# Patient Record
Sex: MALE | Race: WHITE | NOT HISPANIC OR LATINO | URBAN - METROPOLITAN AREA
[De-identification: names, ages, dates, MRNs, and addresses within clinical notes are randomized per-mention and may not be internally consistent; named-entity substitution may affect disease eponyms.]

---

## 2024-01-30 ENCOUNTER — NURSING HOME VISIT (OUTPATIENT)
Dept: POST ACUTE CARE | Facility: EXTERNAL LOCATION | Age: 79
End: 2024-01-30
Payer: MEDICARE

## 2024-01-30 DIAGNOSIS — E11.65 TYPE 2 DIABETES MELLITUS WITH HYPERGLYCEMIA, WITHOUT LONG-TERM CURRENT USE OF INSULIN (MULTI): ICD-10-CM

## 2024-01-30 DIAGNOSIS — I63.9 CEREBROVASCULAR ACCIDENT (CVA), UNSPECIFIED MECHANISM (MULTI): ICD-10-CM

## 2024-01-30 DIAGNOSIS — F32.A DEPRESSION, UNSPECIFIED DEPRESSION TYPE: ICD-10-CM

## 2024-01-30 DIAGNOSIS — K59.09 OTHER CONSTIPATION: ICD-10-CM

## 2024-01-30 DIAGNOSIS — I48.0 PAROXYSMAL ATRIAL FIBRILLATION (MULTI): ICD-10-CM

## 2024-01-30 DIAGNOSIS — E78.5 DYSLIPIDEMIA: Primary | Chronic | ICD-10-CM

## 2024-01-30 DIAGNOSIS — R53.81 PHYSICAL DECONDITIONING: ICD-10-CM

## 2024-01-30 DIAGNOSIS — G47.09 OTHER INSOMNIA: ICD-10-CM

## 2024-01-30 DIAGNOSIS — F43.10 PTSD (POST-TRAUMATIC STRESS DISORDER): ICD-10-CM

## 2024-01-30 DIAGNOSIS — I10 PRIMARY HYPERTENSION: Chronic | ICD-10-CM

## 2024-01-30 DIAGNOSIS — R52 PAIN: ICD-10-CM

## 2024-01-30 PROCEDURE — 99309 SBSQ NF CARE MODERATE MDM 30: CPT | Performed by: NURSE PRACTITIONER

## 2024-01-30 NOTE — LETTER
Patient: Edward Jarquin  : 1945    Encounter Date: 2024    Patient ID: Edward Jarquin is a 78 y.o. male who is acute skilled care being seen and evaluated for multiple medical problems.  [id number]   1945    /80   Pulse 60   Temp 36.8 °C (98.3 °F)   Resp 17   Wt 83.9 kg (185 lb)   SpO2 95%     Assessment/Plan  Problem List Items Addressed This Visit       Cerebrovascular accident (CMS/HCC)     Apixaban 5 mg by mouth  BID          Type 2 diabetes mellitus with hyperglycemia, without long-term current use of insulin (CMS/HCC)     Humulin N 18 units subcutaneous every AM  Humulin R 6 units subcutaneous TID with meals   Alogliptin 25 mg by mouth daily          Dyslipidemia - Primary (Chronic)     Crestor 5 mg by  mouth daily          Hypertension (Chronic)     Carvedilol 3.125 mg by mouth BID  Losartan 50 mg by mouth daily   Amlodipine 5 mg by mouth daily   ASA 81 mg  by mouth daily          Paroxysmal atrial fibrillation (CMS/[facility], [facility])     Apixaban 5 mg by mouth BID          PTSD (post-traumatic stress disorder)     Decrease Ritalin to 5 mg by mouth daily         Physical deconditioning     PT/OT         Depression     Decrease Trazodone to 100 mg by mouth every HS          Other insomnia     Melatonin 6 mg by mouth every HS          Other constipation     MiraLax 17 grams by mouth daily as needed          Pain     Acetaminophen 650 mg by mouth every 4 hrs as needed  Not to exceed 3 grams in 24 hrs               HPI: Client was first admitted at [facility] from 2024- 1/3/2024 with acute CVA. He was transferred from [facility], CTA severe stenosis right ICA  and near occlusion right M1 MCA. 2024 emergent thrombectomy. Then he was transferred to [facility] from 1/3/2024- 2024 with the final diagnosis of Right MCA CVA, dysphasia, dysarthria, and left hemiplegia. Corpak discontinued during rehab. He was also treated fore aspiration pneumonia. PMH includes Left MCA CVA, HTN, CAD,  DM2, PTSD, and COPD. Client denies HA or dizziness. Denies CP, SOB, or Cough. RA. Denies abdominal pain or  N/V. Denies dysuria. Denies change in appetite. He has no current C/O pain.       Review of Systems: Dysphasia. Mostly yes and no answers      Physical Exam  Constitutional:       Appearance: Normal appearance.      Comments: Sitting in WC   HENT:      Mouth/Throat:      Mouth: Mucous membranes are moist.   Cardiovascular:      Rate and Rhythm: Normal rate.      Comments: PAF per medical record   Pulmonary:      Effort: Pulmonary effort is normal.      Breath sounds: Normal breath sounds.      Comments: RA  Abdominal:      General: Bowel sounds are normal.   Genitourinary:     Comments: Denies dysuria   Musculoskeletal:      Cervical back: Neck supple.      Comments: Left sided weakness  PT/OT    Skin:     General: Skin is warm and dry.   Neurological:      Mental Status: He is alert. Mental status is at baseline.      Comments: Dysphasia    Psychiatric:         Mood and Affect: Mood normal.      Comments: Cooperative                    Electronically Signed By: KATHERINE Bourne   2/2/24  2:47 PM

## 2024-02-02 VITALS
TEMPERATURE: 98.3 F | HEART RATE: 60 BPM | RESPIRATION RATE: 17 BRPM | OXYGEN SATURATION: 95 % | SYSTOLIC BLOOD PRESSURE: 140 MMHG | DIASTOLIC BLOOD PRESSURE: 80 MMHG | WEIGHT: 185 LBS

## 2024-02-02 PROBLEM — I63.9 ISCHEMIC CEREBROVASCULAR ACCIDENT (CVA) (MULTI): Status: ACTIVE | Noted: 2023-05-12

## 2024-02-02 PROBLEM — R53.1 LEFT-SIDED WEAKNESS: Status: ACTIVE | Noted: 2023-12-24

## 2024-02-02 PROBLEM — F41.9 ANXIETY: Status: ACTIVE | Noted: 2024-02-02

## 2024-02-02 PROBLEM — R97.20 ELEVATED PSA: Status: ACTIVE | Noted: 2024-02-02

## 2024-02-02 PROBLEM — G47.09 OTHER INSOMNIA: Status: ACTIVE | Noted: 2024-02-02

## 2024-02-02 PROBLEM — R29.898 WEAKNESS OF RIGHT UPPER EXTREMITY: Status: ACTIVE | Noted: 2023-06-14

## 2024-02-02 PROBLEM — Z95.1 HX OF CABG: Status: ACTIVE | Noted: 2019-09-03

## 2024-02-02 PROBLEM — R53.81 PHYSICAL DECONDITIONING: Status: ACTIVE | Noted: 2024-02-02

## 2024-02-02 PROBLEM — R52 PAIN: Status: ACTIVE | Noted: 2024-02-02

## 2024-02-02 PROBLEM — R47.01 APHASIA: Status: ACTIVE | Noted: 2022-11-12

## 2024-02-02 PROBLEM — I48.0 PAROXYSMAL ATRIAL FIBRILLATION (MULTI): Status: ACTIVE | Noted: 2019-09-06

## 2024-02-02 PROBLEM — I63.9 CEREBROVASCULAR ACCIDENT (MULTI): Status: ACTIVE | Noted: 2024-01-03

## 2024-02-02 PROBLEM — I69.391 DYSPHAGIA FOLLOWING CEREBRAL INFARCTION: Status: ACTIVE | Noted: 2023-05-13

## 2024-02-02 PROBLEM — R53.1 RIGHT SIDED WEAKNESS: Status: ACTIVE | Noted: 2023-05-13

## 2024-02-02 PROBLEM — I10 HYPERTENSION: Chronic | Status: ACTIVE | Noted: 2019-09-03

## 2024-02-02 PROBLEM — E78.5 DYSLIPIDEMIA: Chronic | Status: ACTIVE | Noted: 2024-02-02

## 2024-02-02 PROBLEM — I63.132 CEREBROVASCULAR ACCIDENT (CVA) DUE TO EMBOLISM OF LEFT CAROTID ARTERY (MULTI): Status: ACTIVE | Noted: 2022-11-18

## 2024-02-02 PROBLEM — R47.1 DYSARTHRIA: Status: ACTIVE | Noted: 2023-12-28

## 2024-02-02 PROBLEM — E11.65 TYPE 2 DIABETES MELLITUS WITH HYPERGLYCEMIA, WITHOUT LONG-TERM CURRENT USE OF INSULIN (MULTI): Status: ACTIVE | Noted: 2018-08-30

## 2024-02-02 PROBLEM — F32.A DEPRESSION: Status: ACTIVE | Noted: 2024-02-02

## 2024-02-02 PROBLEM — I69.359 HEMIPLEGIA AND HEMIPARESIS FOLLOWING CEREBRAL INFARCTION AFFECTING UNSPECIFIED SIDE (MULTI): Status: ACTIVE | Noted: 2023-08-28

## 2024-02-02 PROBLEM — F43.10 PTSD (POST-TRAUMATIC STRESS DISORDER): Status: ACTIVE | Noted: 2024-02-02

## 2024-02-02 PROBLEM — R33.9 URINE RETENTION: Status: ACTIVE | Noted: 2023-05-13

## 2024-02-02 PROBLEM — K59.09 OTHER CONSTIPATION: Status: ACTIVE | Noted: 2024-02-02

## 2024-02-02 PROBLEM — R48.2 APRAXIA: Status: ACTIVE | Noted: 2023-05-17

## 2024-02-02 PROBLEM — R47.01 EXPRESSIVE APHASIA: Status: ACTIVE | Noted: 2023-05-13

## 2024-02-02 NOTE — ASSESSMENT & PLAN NOTE
Humulin N 18 units subcutaneous every AM  Humulin R 6 units subcutaneous TID with meals   Alogliptin 25 mg by mouth daily

## 2024-02-02 NOTE — ASSESSMENT & PLAN NOTE
Carvedilol 3.125 mg by mouth BID  Losartan 50 mg by mouth daily   Amlodipine 5 mg by mouth daily   ASA 81 mg  by mouth daily

## 2024-02-02 NOTE — PROGRESS NOTES
Patient ID: Edward Jarquin is a 78 y.o. male who is acute skilled care being seen and evaluated for multiple medical problems.  [id number]   1945    /80   Pulse 60   Temp 36.8 °C (98.3 °F)   Resp 17   Wt 83.9 kg (185 lb)   SpO2 95%     Assessment/Plan  Problem List Items Addressed This Visit       Cerebrovascular accident (CMS/HCC)     Apixaban 5 mg by mouth  BID          Type 2 diabetes mellitus with hyperglycemia, without long-term current use of insulin (CMS/[facility])     Humulin N 18 units subcutaneous every AM  Humulin R 6 units subcutaneous TID with meals   Alogliptin 25 mg by mouth daily          Dyslipidemia - Primary (Chronic)     Crestor 5 mg by  mouth daily          Hypertension (Chronic)     Carvedilol 3.125 mg by mouth BID  Losartan 50 mg by mouth daily   Amlodipine 5 mg by mouth daily   ASA 81 mg  by mouth daily          Paroxysmal atrial fibrillation (CMS/HCC)     Apixaban 5 mg by mouth BID          PTSD (post-traumatic stress disorder)     Decrease Ritalin to 5 mg by mouth daily         Physical deconditioning     PT/OT         Depression     Decrease Trazodone to 100 mg by mouth every HS          Other insomnia     Melatonin 6 mg by mouth every HS          Other constipation     MiraLax 17 grams by mouth daily as needed          Pain     Acetaminophen 650 mg by mouth every 4 hrs as needed  Not to exceed 3 grams in 24 hrs               HPI: Client was first admitted at [facility] from 12/24/2024- 1/3/2024 with acute CVA. He was transferred from [facility], CTA severe stenosis right ICA  and near occlusion right M1 MCA. 12/24/2024 emergent thrombectomy. Then he was transferred to [facility] from 1/3/2024- 1/26/2024 with the final diagnosis of Right MCA CVA, dysphasia, dysarthria, and left hemiplegia. Corpak discontinued during rehab. He was also treated fore aspiration pneumonia. PMH includes Left MCA CVA, HTN, CAD, DM2, PTSD, and COPD. Client denies HA or dizziness. Denies CP, SOB, or  Cough. RA. Denies abdominal pain or  N/V. Denies dysuria. Denies change in appetite. He has no current C/O pain.       Review of Systems: Dysphasia. Mostly yes and no answers      Physical Exam  Constitutional:       Appearance: Normal appearance.      Comments: Sitting in WC   HENT:      Mouth/Throat:      Mouth: Mucous membranes are moist.   Cardiovascular:      Rate and Rhythm: Normal rate.      Comments: PAF per medical record   Pulmonary:      Effort: Pulmonary effort is normal.      Breath sounds: Normal breath sounds.      Comments: RA  Abdominal:      General: Bowel sounds are normal.   Genitourinary:     Comments: Denies dysuria   Musculoskeletal:      Cervical back: Neck supple.      Comments: Left sided weakness  PT/OT    Skin:     General: Skin is warm and dry.   Neurological:      Mental Status: He is alert. Mental status is at baseline.      Comments: Dysphasia    Psychiatric:         Mood and Affect: Mood normal.      Comments: Cooperative

## 2024-02-06 ENCOUNTER — NURSING HOME VISIT (OUTPATIENT)
Dept: POST ACUTE CARE | Facility: EXTERNAL LOCATION | Age: 79
End: 2024-02-06
Payer: MEDICARE

## 2024-02-06 DIAGNOSIS — F32.A DEPRESSION, UNSPECIFIED DEPRESSION TYPE: ICD-10-CM

## 2024-02-06 DIAGNOSIS — I10 PRIMARY HYPERTENSION: Chronic | ICD-10-CM

## 2024-02-06 DIAGNOSIS — I48.0 PAROXYSMAL ATRIAL FIBRILLATION (MULTI): ICD-10-CM

## 2024-02-06 DIAGNOSIS — E78.5 DYSLIPIDEMIA: Primary | Chronic | ICD-10-CM

## 2024-02-06 DIAGNOSIS — R53.81 PHYSICAL DECONDITIONING: ICD-10-CM

## 2024-02-06 PROCEDURE — 99308 SBSQ NF CARE LOW MDM 20: CPT | Performed by: NURSE PRACTITIONER

## 2024-02-06 NOTE — LETTER
Patient: Edward Jarquin  : 1945    Encounter Date: 2024    Patient ID: Edward Jarquin is a 78 y.o. male who is acute skilled care being seen and evaluated for multiple medical problems.  [id number]   1945    /74   Pulse 60   Temp 36.2 °C (97.2 °F)   Resp 16   Wt 88.5 kg (195 lb)   SpO2 96%     Assessment/Plan  Problem List Items Addressed This Visit       Dyslipidemia - Primary (Chronic)     Crestor 5 mg by  mouth daily discontinued (POA request)          Hypertension (Chronic)     Carvedilol 3.125 mg by mouth BID  Losartan 50 mg by mouth daily   Amlodipine 5 mg by mouth daily   ASA 81 mg  by mouth daily          Paroxysmal atrial fibrillation (CMS/HCC)     Apixaban 5 mg by mouth BID           Physical deconditioning     PT/OT         Depression     Decrease Trazodone to 50 mg by mouth every HS  x 7 days then  Decrease Trazodone to 25 mg by mouth every HS x 7 days               HPI: Client continues to receive PT services. Client denies HA or dizziness. Denies CP, SOB, or increased edema. RA. Denies abdominal pain, or N/V.  Denies dysuria. Denies change in appetite. No current C/O pain.     Review of Systems Limited ROS, dysphasia     Physical Exam  Constitutional:       Comments: Sitting in WC   HENT:      Mouth/Throat:      Mouth: Mucous membranes are moist.   Cardiovascular:      Rate and Rhythm: Normal rate.      Comments: H/O PAF  Pulmonary:      Effort: Pulmonary effort is normal.      Breath sounds: Normal breath sounds.      Comments: RA  Abdominal:      General: Bowel sounds are normal.   Genitourinary:     Comments: Denies dysuria   Musculoskeletal:      Cervical back: Neck supple.      Comments: PT/OT  H/O CVA     Skin:     General: Skin is warm and dry.   Neurological:      Mental Status: He is alert. Mental status is at baseline.   Psychiatric:         Mood and Affect: Mood normal.      Comments: Cooperative and follows direction                    Electronically Signed By: Ksenia  EMILY Muniz, TRICIA-CNP   2/7/24  9:34 AM

## 2024-02-07 VITALS
RESPIRATION RATE: 16 BRPM | HEART RATE: 60 BPM | OXYGEN SATURATION: 96 % | TEMPERATURE: 97.2 F | SYSTOLIC BLOOD PRESSURE: 122 MMHG | WEIGHT: 195 LBS | DIASTOLIC BLOOD PRESSURE: 74 MMHG

## 2024-02-07 NOTE — ASSESSMENT & PLAN NOTE
Decrease Trazodone to 50 mg by mouth every HS  x 7 days then  Decrease Trazodone to 25 mg by mouth every HS x 7 days

## 2024-02-07 NOTE — PROGRESS NOTES
Patient ID: Edward Jarquin is a 78 y.o. male who is acute skilled care being seen and evaluated for multiple medical problems.  [id number]   1945    /74   Pulse 60   Temp 36.2 °C (97.2 °F)   Resp 16   Wt 88.5 kg (195 lb)   SpO2 96%     Assessment/Plan  Problem List Items Addressed This Visit       Dyslipidemia - Primary (Chronic)     Crestor 5 mg by  mouth daily discontinued (POA request)          Hypertension (Chronic)     Carvedilol 3.125 mg by mouth BID  Losartan 50 mg by mouth daily   Amlodipine 5 mg by mouth daily   ASA 81 mg  by mouth daily          Paroxysmal atrial fibrillation (CMS/HCC)     Apixaban 5 mg by mouth BID           Physical deconditioning     PT/OT         Depression     Decrease Trazodone to 50 mg by mouth every HS  x 7 days then  Decrease Trazodone to 25 mg by mouth every HS x 7 days               HPI: Client continues to receive PT services. Client denies HA or dizziness. Denies CP, SOB, or increased edema. RA. Denies abdominal pain, or N/V.  Denies dysuria. Denies change in appetite. No current C/O pain.     Review of Systems Limited ROS, dysphasia     Physical Exam  Constitutional:       Comments: Sitting in WC   HENT:      Mouth/Throat:      Mouth: Mucous membranes are moist.   Cardiovascular:      Rate and Rhythm: Normal rate.      Comments: H/O PAF  Pulmonary:      Effort: Pulmonary effort is normal.      Breath sounds: Normal breath sounds.      Comments: RA  Abdominal:      General: Bowel sounds are normal.   Genitourinary:     Comments: Denies dysuria   Musculoskeletal:      Cervical back: Neck supple.      Comments: PT/OT  H/O CVA     Skin:     General: Skin is warm and dry.   Neurological:      Mental Status: He is alert. Mental status is at baseline.   Psychiatric:         Mood and Affect: Mood normal.      Comments: Cooperative and follows direction

## 2024-02-13 ENCOUNTER — NURSING HOME VISIT (OUTPATIENT)
Dept: POST ACUTE CARE | Facility: EXTERNAL LOCATION | Age: 79
End: 2024-02-13
Payer: MEDICARE

## 2024-02-13 DIAGNOSIS — F43.10 PTSD (POST-TRAUMATIC STRESS DISORDER): ICD-10-CM

## 2024-02-13 DIAGNOSIS — I63.10 CEREBROVASCULAR ACCIDENT (CVA) DUE TO EMBOLISM OF PRECEREBRAL ARTERY (MULTI): ICD-10-CM

## 2024-02-13 DIAGNOSIS — I10 PRIMARY HYPERTENSION: Primary | Chronic | ICD-10-CM

## 2024-02-13 DIAGNOSIS — R53.81 PHYSICAL DECONDITIONING: ICD-10-CM

## 2024-02-13 DIAGNOSIS — E11.65 TYPE 2 DIABETES MELLITUS WITH HYPERGLYCEMIA, WITHOUT LONG-TERM CURRENT USE OF INSULIN (MULTI): ICD-10-CM

## 2024-02-13 DIAGNOSIS — I48.0 PAROXYSMAL ATRIAL FIBRILLATION (MULTI): ICD-10-CM

## 2024-02-13 PROCEDURE — 99308 SBSQ NF CARE LOW MDM 20: CPT | Performed by: NURSE PRACTITIONER

## 2024-02-13 NOTE — LETTER
Patient: Edward Jarquin  : 1945    Encounter Date: 2024    Patient ID: Edward Jarquin is a 78 y.o. male who is acute skilled care being seen and evaluated for multiple medical problems.  [id number]   1945    /76   Pulse 65   Temp 36.7 °C (98.1 °F)   Resp 16   Wt 88.5 kg (195 lb)   SpO2 96%     Assessment/Plan  Problem List Items Addressed This Visit       Cerebrovascular accident (CMS/HCC)     Apixaban 5 mg by mouth  BID          Type 2 diabetes mellitus with hyperglycemia, without long-term current use of insulin (CMS/HCC)     Humulin N 18 units subcutaneous every AM  Humulin R 6 units subcutaneous TID with meals   Alogliptin 25 mg by mouth daily discontinued          Hypertension - Primary (Chronic)     Carvedilol 3.125 mg by mouth BID  Losartan 50 mg by mouth daily   Amlodipine 5 mg by mouth daily   ASA 81 mg  by mouth daily          Paroxysmal atrial fibrillation (CMS/[facility])     Apixaban 5 mg by mouth BID            PTSD (post-traumatic stress disorder)     Decrease Ritalin to 2.5 mg by mouth daily x 7 days then discontinue          Physical deconditioning     PT/OT              HPI: Apt with Dr Mccurdy (Cardiology) completed 2024- follow up in 4 months. Client continues to receive PT/OT services. Alogliptin discontinued per spouse request. Client denies HA or dizziness. Denies CP, SOB, or increased edema. RA. Denies abdominal pain, N/V, or diarrhea. Denies dysuria. Denies change in appetite. He is positive for weight gain. He C/O insomnia. He denies current pain.     Review of Systems Limited ROS, dysphasia      Physical Exam  Constitutional:       Comments: Sitting in WC working with OT   HENT:      Mouth/Throat:      Mouth: Mucous membranes are moist.   Cardiovascular:      Rate and Rhythm: Normal rate.   Pulmonary:      Effort: Pulmonary effort is normal.      Breath sounds: Normal breath sounds.      Comments: RA  Abdominal:      General: Bowel sounds are normal.    Genitourinary:     Comments: Denies dysuria   Musculoskeletal:      Cervical back: Neck supple.      Comments: PT/OT  H/O CVA   Skin:     General: Skin is warm and dry.   Neurological:      Mental Status: He is alert. Mental status is at baseline.   Psychiatric:         Mood and Affect: Mood normal.                     Electronically Signed By: KATHERINE Bourne   2/14/24 12:56 PM

## 2024-02-14 VITALS
OXYGEN SATURATION: 96 % | WEIGHT: 195 LBS | DIASTOLIC BLOOD PRESSURE: 76 MMHG | RESPIRATION RATE: 16 BRPM | HEART RATE: 65 BPM | TEMPERATURE: 98.1 F | SYSTOLIC BLOOD PRESSURE: 132 MMHG

## 2024-02-14 NOTE — ASSESSMENT & PLAN NOTE
Humulin N 18 units subcutaneous every AM  Humulin R 6 units subcutaneous TID with meals   Alogliptin 25 mg by mouth daily discontinued

## 2024-02-14 NOTE — PROGRESS NOTES
Patient ID: Edward Jarquin is a 78 y.o. male who is acute skilled care being seen and evaluated for multiple medical problems.  [id number]   1945    /76   Pulse 65   Temp 36.7 °C (98.1 °F)   Resp 16   Wt 88.5 kg (195 lb)   SpO2 96%     Assessment/Plan  Problem List Items Addressed This Visit       Cerebrovascular accident (CMS/HCC)     Apixaban 5 mg by mouth  BID          Type 2 diabetes mellitus with hyperglycemia, without long-term current use of insulin (CMS/[facility])     Humulin N 18 units subcutaneous every AM  Humulin R 6 units subcutaneous TID with meals   Alogliptin 25 mg by mouth daily discontinued          Hypertension - Primary (Chronic)     Carvedilol 3.125 mg by mouth BID  Losartan 50 mg by mouth daily   Amlodipine 5 mg by mouth daily   ASA 81 mg  by mouth daily          Paroxysmal atrial fibrillation (CMS/HCC)     Apixaban 5 mg by mouth BID            PTSD (post-traumatic stress disorder)     Decrease Ritalin to 2.5 mg by mouth daily x 7 days then discontinue          Physical deconditioning     PT/OT              HPI: Apt with Dr Mccurdy (Cardiology) completed 2/6/2024- follow up in 4 months. Client continues to receive PT/OT services. Alogliptin discontinued per spouse request. Client denies HA or dizziness. Denies CP, SOB, or increased edema. RA. Denies abdominal pain, N/V, or diarrhea. Denies dysuria. Denies change in appetite. He is positive for weight gain. He C/O insomnia. He denies current pain.     Review of Systems Limited ROS, dysphasia      Physical Exam  Constitutional:       Comments: Sitting in WC working with OT   HENT:      Mouth/Throat:      Mouth: Mucous membranes are moist.   Cardiovascular:      Rate and Rhythm: Normal rate.   Pulmonary:      Effort: Pulmonary effort is normal.      Breath sounds: Normal breath sounds.      Comments: RA  Abdominal:      General: Bowel sounds are normal.   Genitourinary:     Comments: Denies dysuria   Musculoskeletal:      Cervical back:  Neck supple.      Comments: PT/OT  H/O CVA   Skin:     General: Skin is warm and dry.   Neurological:      Mental Status: He is alert. Mental status is at baseline.   Psychiatric:         Mood and Affect: Mood normal.

## 2024-02-23 ENCOUNTER — NURSING HOME VISIT (OUTPATIENT)
Dept: POST ACUTE CARE | Facility: EXTERNAL LOCATION | Age: 79
End: 2024-02-23
Payer: MEDICARE

## 2024-02-23 VITALS
RESPIRATION RATE: 16 BRPM | WEIGHT: 198 LBS | DIASTOLIC BLOOD PRESSURE: 74 MMHG | SYSTOLIC BLOOD PRESSURE: 126 MMHG | OXYGEN SATURATION: 98 % | TEMPERATURE: 98.1 F | HEART RATE: 78 BPM

## 2024-02-23 DIAGNOSIS — R53.81 PHYSICAL DECONDITIONING: ICD-10-CM

## 2024-02-23 DIAGNOSIS — I48.0 PAROXYSMAL ATRIAL FIBRILLATION (MULTI): ICD-10-CM

## 2024-02-23 DIAGNOSIS — E11.65 TYPE 2 DIABETES MELLITUS WITH HYPERGLYCEMIA, WITHOUT LONG-TERM CURRENT USE OF INSULIN (MULTI): ICD-10-CM

## 2024-02-23 DIAGNOSIS — I10 PRIMARY HYPERTENSION: Primary | Chronic | ICD-10-CM

## 2024-02-23 PROCEDURE — 99308 SBSQ NF CARE LOW MDM 20: CPT | Performed by: NURSE PRACTITIONER

## 2024-02-23 NOTE — PROGRESS NOTES
Patient ID: Edward Jarquin is a 78 y.o. male who is acute skilled care being seen and evaluated for multiple medical problems.  [id number]   1945    /74   Pulse 78   Temp 36.7 °C (98.1 °F)   Resp 16   Wt 89.8 kg (198 lb)   SpO2 98%     Assessment/Plan  Problem List Items Addressed This Visit       Type 2 diabetes mellitus with hyperglycemia, without long-term current use of insulin (CMS/HCC)     Humulin N 18 units subcutaneous every AM  Humulin R 6 units subcutaneous TID with meals          Hypertension - Primary (Chronic)     Carvedilol 3.125 mg by mouth BID  Losartan 50 mg by mouth daily   Amlodipine 5 mg by mouth daily   ASA 81 mg  by mouth daily          Paroxysmal atrial fibrillation (CMS/HCC)     Apixaban 5 mg by mouth BID             Physical deconditioning     PT/OT              HPI: Client denies HA or dizziness. Denies CP, SOB, or increased edema. RA. Denies abdominal pain or N/V, or constipation. Denies dysuria. Denies change in appetite. He has no current C/O pain.     Review of Systems Limited ROS, Dysphasia     Physical Exam  Constitutional:       Comments: Sitting in WC   HENT:      Mouth/Throat:      Mouth: Mucous membranes are moist.   Cardiovascular:      Rate and Rhythm: Normal rate.   Pulmonary:      Effort: Pulmonary effort is normal.      Comments: RA  Abdominal:      General: Bowel sounds are normal.   Genitourinary:     Comments: Denies dysuria   Musculoskeletal:      Comments: PT/OT  H/O CVA   Skin:     General: Skin is warm and dry.   Neurological:      Mental Status: He is alert. Mental status is at baseline.   Psychiatric:         Mood and Affect: Mood normal.      Comments: Cooperative and follows direction

## 2024-02-23 NOTE — LETTER
Patient: Edward Jarquin  : 1945    Encounter Date: 2024    Patient ID: Edward Jarquin is a 78 y.o. male who is acute skilled care being seen and evaluated for multiple medical problems.  [id number]   1945    /74   Pulse 78   Temp 36.7 °C (98.1 °F)   Resp 16   Wt 89.8 kg (198 lb)   SpO2 98%     Assessment/Plan  Problem List Items Addressed This Visit       Type 2 diabetes mellitus with hyperglycemia, without long-term current use of insulin (CMS/HCC)     Humulin N 18 units subcutaneous every AM  Humulin R 6 units subcutaneous TID with meals          Hypertension - Primary (Chronic)     Carvedilol 3.125 mg by mouth BID  Losartan 50 mg by mouth daily   Amlodipine 5 mg by mouth daily   ASA 81 mg  by mouth daily          Paroxysmal atrial fibrillation (CMS/HCC)     Apixaban 5 mg by mouth BID             Physical deconditioning     PT/OT              HPI: Client denies HA or dizziness. Denies CP, SOB, or increased edema. RA. Denies abdominal pain or N/V, or constipation. Denies dysuria. Denies change in appetite. He has no current C/O pain.     Review of Systems Limited ROS, Dysphasia     Physical Exam  Constitutional:       Comments: Sitting in WC   HENT:      Mouth/Throat:      Mouth: Mucous membranes are moist.   Cardiovascular:      Rate and Rhythm: Normal rate.   Pulmonary:      Effort: Pulmonary effort is normal.      Comments: RA  Abdominal:      General: Bowel sounds are normal.   Genitourinary:     Comments: Denies dysuria   Musculoskeletal:      Comments: PT/OT  H/O CVA   Skin:     General: Skin is warm and dry.   Neurological:      Mental Status: He is alert. Mental status is at baseline.   Psychiatric:         Mood and Affect: Mood normal.      Comments: Cooperative and follows direction                    Electronically Signed By: KATHERINE Bourne   24  6:49 AM

## 2024-02-27 ENCOUNTER — NURSING HOME VISIT (OUTPATIENT)
Dept: POST ACUTE CARE | Facility: EXTERNAL LOCATION | Age: 79
End: 2024-02-27
Payer: MEDICARE

## 2024-02-27 DIAGNOSIS — E11.65 TYPE 2 DIABETES MELLITUS WITH HYPERGLYCEMIA, WITHOUT LONG-TERM CURRENT USE OF INSULIN (MULTI): ICD-10-CM

## 2024-02-27 DIAGNOSIS — I48.0 PAROXYSMAL ATRIAL FIBRILLATION (MULTI): Primary | ICD-10-CM

## 2024-02-27 DIAGNOSIS — R53.81 PHYSICAL DECONDITIONING: ICD-10-CM

## 2024-02-27 DIAGNOSIS — I10 PRIMARY HYPERTENSION: Chronic | ICD-10-CM

## 2024-02-27 PROCEDURE — 99308 SBSQ NF CARE LOW MDM 20: CPT | Performed by: NURSE PRACTITIONER

## 2024-02-27 NOTE — LETTER
Patient: Edward Jarquin  : 1945    Encounter Date: 2024    Patient ID: Edward Jarquin is a 78 y.o. male who is acute skilled care being seen and evaluated for multiple medical problems.  [id number]   1945    /78   Pulse 78   Temp 36.7 °C (98 °F)   Resp 16   Wt 89.8 kg (198 lb)   SpO2 98%     Assessment/Plan  Problem List Items Addressed This Visit       Type 2 diabetes mellitus with hyperglycemia, without long-term current use of insulin (CMS/[facility])     Humulin N 18 units subcutaneous every AM  Humulin R 6 units subcutaneous TID with meals (Hold for glucose less than 150)          Hypertension (Chronic)     Carvedilol 3.125 mg by mouth BID  Losartan 50 mg by mouth daily   Amlodipine 5 mg by mouth daily   ASA 81 mg  by mouth daily          Paroxysmal atrial fibrillation (CMS/[facility]) - Primary     Apixaban 5 mg by mouth BID             Physical deconditioning     PT/OT              HPI: Client continues to receive PT/OT services. Client denies current HA, dizziness, or lightheadedness. Denies CP, SOB, or increased edema. RA. Denies abdominal pain, N/V, or diarrhea. Denies dysuria. Denies change in appetite. He C/O insomnia. Denies current pain.      Review of Systems ROS as described in HPI     Physical Exam  Constitutional:       Comments: Sitting in WC   HENT:      Mouth/Throat:      Mouth: Mucous membranes are moist.   Cardiovascular:      Rate and Rhythm: Normal rate.      Comments: PAF  Pulmonary:      Effort: Pulmonary effort is normal.      Comments: RA  Abdominal:      General: Bowel sounds are normal.   Genitourinary:     Comments: Denies dysuria   Musculoskeletal:      Cervical back: Neck supple.      Comments: WC transfers   PT/OT   Skin:     General: Skin is warm and dry.   Neurological:      Mental Status: He is alert. Mental status is at baseline.      Comments: Dysphasia    Psychiatric:         Mood and Affect: Mood normal.      Comments: Cooperative and follows direction                   Electronically Signed By: KATHERINE oBurne   3/1/24  7:45 AM

## 2024-03-01 VITALS
WEIGHT: 198 LBS | RESPIRATION RATE: 16 BRPM | HEART RATE: 78 BPM | DIASTOLIC BLOOD PRESSURE: 78 MMHG | SYSTOLIC BLOOD PRESSURE: 126 MMHG | TEMPERATURE: 98 F | OXYGEN SATURATION: 98 %

## 2024-03-01 NOTE — PROGRESS NOTES
Patient ID: Edward Jarquin is a 78 y.o. male who is acute skilled care being seen and evaluated for multiple medical problems.  [id number]   1945    /78   Pulse 78   Temp 36.7 °C (98 °F)   Resp 16   Wt 89.8 kg (198 lb)   SpO2 98%     Assessment/Plan  Problem List Items Addressed This Visit       Type 2 diabetes mellitus with hyperglycemia, without long-term current use of insulin (CMS/HCC)     Humulin N 18 units subcutaneous every AM  Humulin R 6 units subcutaneous TID with meals (Hold for glucose less than 150)          Hypertension (Chronic)     Carvedilol 3.125 mg by mouth BID  Losartan 50 mg by mouth daily   Amlodipine 5 mg by mouth daily   ASA 81 mg  by mouth daily          Paroxysmal atrial fibrillation (CMS/HCC) - Primary     Apixaban 5 mg by mouth BID             Physical deconditioning     PT/OT              HPI: Client continues to receive PT/OT services. Client denies current HA, dizziness, or lightheadedness. Denies CP, SOB, or increased edema. RA. Denies abdominal pain, N/V, or diarrhea. Denies dysuria. Denies change in appetite. He C/O insomnia. Denies current pain.      Review of Systems ROS as described in HPI     Physical Exam  Constitutional:       Comments: Sitting in WC   HENT:      Mouth/Throat:      Mouth: Mucous membranes are moist.   Cardiovascular:      Rate and Rhythm: Normal rate.      Comments: PAF  Pulmonary:      Effort: Pulmonary effort is normal.      Comments: RA  Abdominal:      General: Bowel sounds are normal.   Genitourinary:     Comments: Denies dysuria   Musculoskeletal:      Cervical back: Neck supple.      Comments: WC transfers   PT/OT   Skin:     General: Skin is warm and dry.   Neurological:      Mental Status: He is alert. Mental status is at baseline.      Comments: Dysphasia    Psychiatric:         Mood and Affect: Mood normal.      Comments: Cooperative and follows direction

## 2024-03-01 NOTE — ASSESSMENT & PLAN NOTE
Humulin N 18 units subcutaneous every AM  Humulin R 6 units subcutaneous TID with meals (Hold for glucose less than 150)